# Patient Record
Sex: MALE | Race: WHITE
[De-identification: names, ages, dates, MRNs, and addresses within clinical notes are randomized per-mention and may not be internally consistent; named-entity substitution may affect disease eponyms.]

---

## 2018-04-01 ENCOUNTER — HOSPITAL ENCOUNTER (EMERGENCY)
Dept: HOSPITAL 56 - MW.ED | Age: 63
Discharge: HOME | End: 2018-04-01
Payer: COMMERCIAL

## 2018-04-01 VITALS — DIASTOLIC BLOOD PRESSURE: 81 MMHG | SYSTOLIC BLOOD PRESSURE: 121 MMHG

## 2018-04-01 DIAGNOSIS — Z79.899: ICD-10-CM

## 2018-04-01 DIAGNOSIS — E78.00: ICD-10-CM

## 2018-04-01 DIAGNOSIS — R07.89: Primary | ICD-10-CM

## 2018-04-01 DIAGNOSIS — I10: ICD-10-CM

## 2018-04-01 DIAGNOSIS — E03.9: ICD-10-CM

## 2018-04-01 LAB
CHLORIDE SERPL-SCNC: 102 MMOL/L (ref 98–107)
SODIUM SERPL-SCNC: 137 MMOL/L (ref 136–148)

## 2018-04-01 PROCEDURE — 36415 COLL VENOUS BLD VENIPUNCTURE: CPT

## 2018-04-01 PROCEDURE — 80053 COMPREHEN METABOLIC PANEL: CPT

## 2018-04-01 PROCEDURE — 96374 THER/PROPH/DIAG INJ IV PUSH: CPT

## 2018-04-01 PROCEDURE — 99285 EMERGENCY DEPT VISIT HI MDM: CPT

## 2018-04-01 PROCEDURE — 84484 ASSAY OF TROPONIN QUANT: CPT

## 2018-04-01 PROCEDURE — 85025 COMPLETE CBC W/AUTO DIFF WBC: CPT

## 2018-04-01 PROCEDURE — 71046 X-RAY EXAM CHEST 2 VIEWS: CPT

## 2018-04-01 PROCEDURE — 93005 ELECTROCARDIOGRAM TRACING: CPT

## 2018-04-01 NOTE — EDM.PDOC
ED HPI GENERAL MEDICAL PROBLEM





- General


Chief Complaint: Chest Pain


Stated Complaint: CHEST PAIN AND SOB


Time Seen by Provider: 04/01/18 11:52


Source of Information: Reports: Patient


History Limitations: Reports: No Limitations





- History of Present Illness


INITIAL COMMENTS - FREE TEXT/NARRATIVE: 


History of present illness:


[]Patient was treated for cough with Zithromax and is on his last day having 

right-sided rib pain with breathing. He is not complaining of more shortness of 

breath and denies any fevers chills any longer.





Review of systems: 


As per history of present illness and below otherwise all systems reviewed and 

negative.





Past medical history: 


As per history of present illness and as reviewed below otherwise 

noncontributory.





Surgical history: 


As per history of present illness and as reviewed below otherwise 

noncontributory.





Social history: 


No reported history of drug or alcohol abuse.





Family history: 


As per history of present illness and as reviewed below otherwise 

noncontributory.





Physical exam:


General: Well developed, well nourished in NAD


HEENT: Atraumatic, normocephalic, pupils reactive, negative for conjunctival 

pallor or scleral icterus, mucous membranes moist, throat clear, neck supple, 

nontender, trachea midline.


Lungs: Clear to auscultation, breath sounds equal bilaterally, chest tender to 

palpation over the right ribs. No subcutaneous crepitance tender


Heart: S1S2, regular, negative for clicks, rubs, or JVD.


Abdomen: Soft, nondistended, nontender. Negative for masses or 

hepatosplenomegaly. Negative for costovertebral tenderness.


Pelvis: Stable nontender.


Genitourinary: Deferred.


Rectal: Deferred.


Extremities: Atraumatic, negative for cords or calf pain. Neurovascular 

unremarkable.


Neuro: Awake, alert, oriented. Cranial nerves II through XII unremarkable. 

Cerebellum unremarkable. Motor and sensory unremarkable throughout. Exam 

nonfocal.





Diagnostics:


[]Chest x-ray negative for trach, pneumothorax or rib fracture. CBC negative, 

chemistries mildly elevated LFTs which patient states is normal for him





Therapeutics:


[]Aspirin and nitroglycerin nitroglycerin given initially Toradol with 

improvement of pain





Impression: 


[]Chest Wall pain





Plan:


[]Tramadol continue meds at home return if symptoms worsen or change follow-up 

with primary care





Definitive disposition and diagnosis as appropriate pending reevaluation and 

review of above.





  ** Right Chest


Pain Score (Numeric/FACES): 9





- Related Data


 Allergies











Allergy/AdvReac Type Severity Reaction Status Date / Time


 


No Known Allergies Allergy   Verified 04/01/18 11:50











Home Meds: 


 Home Meds





Levothyroxine 0.15 mg PO DAILY 06/24/15 [History]


atorvaSTATin [Lipitor] 40 mg PO DAILY 06/25/15 [History]


Allopurinol [Zyloprim] 0 mg PO DAILY 04/01/18 [History]


Losartan [Cozaar] 0 mg PO DAILY 04/01/18 [History]











Past Medical History


HEENT History: Reports: None


Cardiovascular History: Reports: High Cholesterol, Hypertension


Respiratory History: Reports: None


Other Respiratory History: does no use CPAP


Gastrointestinal History: Reports: None


Genitourinary History: Reports: None


Musculoskeletal History: Reports: Gout


Psychiatric History: Reports: None


Endocrine/Metabolic History: Reports: Hypothyroidism


Other Hematologic History: had blood transfusion as an infant





- Infectious Disease History


Infectious Disease History: Reports: None


Other Infectious Disease History: Pt does not know





- Past Surgical History


HEENT Surgical History: Reports: None


GI Surgical History: Reports: None





Social & Family History





- Family History


Family Medical History: Noncontributory





- Tobacco Use


Smoking Status *Q: Never Smoker





- Caffeine Use


Caffeine Use: Reports: None





- Alcohol Use


Days Per Week of Alcohol Use: 2





- Recreational Drug Use


Recreational Drug Use: No


Drug Use in Last 12 Months: No





ED ROS GENERAL





- Review of Systems


Review Of Systems: See Below (See history of present illness)





ED EXAM, GI/ABD





- Physical Exam


Exam: See Below (The history of present illness)





Course





- Vital Signs


Last Recorded V/S: 





 Last Vital Signs











Temp  97.6 F   04/01/18 11:47


 


Pulse  94   04/01/18 11:47


 


Resp  18   04/01/18 11:47


 


BP  131/93 H  04/01/18 12:01


 


Pulse Ox  97   04/01/18 11:47














- Orders/Labs/Meds


Orders: 





 Active Orders 24 hr











 Category Date Time Status


 


 EKG 12 Lead [EKG Documentation Completion] [RC] STAT Care  04/01/18 12:16 

Active


 


 Chest 2V [CR] Stat Exams  04/01/18 11:58 Taken


 


 Nitroglycerin [Nitrostat] Med  04/01/18 11:55 Active





 0.4 mg SL Q5M PRN   








 Medication Orders





Nitroglycerin (Nitrostat)  0.4 mg SL Q5M PRN


   PRN Reason: Chest Pain


   Last Admin: 04/01/18 12:01  Dose: 0.4 mg








Labs: 





 Laboratory Tests











  04/01/18 04/01/18 Range/Units





  11:49 11:49 


 


WBC  11.74 H   (4.0-11.0)  K/uL


 


RBC  4.90   (4.50-5.90)  M/uL


 


Hgb  14.5   (13.0-17.0)  g/dL


 


Hct  42.5   (38.0-50.0)  %


 


MCV  86.7   (80.0-98.0)  fL


 


MCH  29.6   (27.0-32.0)  pg


 


MCHC  34.1   (31.0-37.0)  g/dL


 


RDW Std Deviation  42.8   (28.0-62.0)  fl


 


RDW Coeff of Markus  14   (11.0-15.0)  %


 


Plt Count  265   (150-400)  K/uL


 


MPV  9.90   (7.40-12.00)  fL


 


Neut % (Auto)  74.0   (48.0-80.0)  %


 


Lymph % (Auto)  14.7 L   (16.0-40.0)  %


 


Mono % (Auto)  8.1   (0.0-15.0)  %


 


Eos % (Auto)  2.8   (0.0-7.0)  %


 


Baso % (Auto)  0.4   (0.0-1.5)  %


 


Neut # (Auto)  8.7 H   (1.4-5.7)  K/uL


 


Lymph # (Auto)  1.7   (0.6-2.4)  K/uL


 


Mono # (Auto)  1.0 H   (0.0-0.8)  K/uL


 


Eos # (Auto)  0.3   (0.0-0.7)  K/uL


 


Baso # (Auto)  0.1   (0.0-0.1)  K/uL


 


Nucleated RBC %  0.0   /100WBC


 


Nucleated RBCs #  0   K/uL


 


Sodium   137  (136-148)  mmol/L


 


Potassium   4.4  (3.5-5.1)  mmol/L


 


Chloride   102  ()  mmol/L


 


Carbon Dioxide   23.1  (21.0-32.0)  mmol/L


 


BUN   26 H  (7.0-18.0)  mg/dL


 


Creatinine   1.9 H  (0.8-1.3)  mg/dL


 


Est Cr Clr Drug Dosing   42.69  mL/min


 


Estimated GFR (MDRD)   36.3  ml/min


 


Glucose   125 H  ()  mg/dL


 


Calcium   9.2  (8.5-10.1)  mg/dL


 


Total Bilirubin   1.0  (0.2-1.0)  mg/dL


 


AST   57 H  (15-37)  IU/L


 


ALT   69 H  (14-63)  IU/L


 


Alkaline Phosphatase   125 H  ()  U/L


 


Troponin I   < 0.050  (0.000-0.056)  ng/mL


 


Total Protein   7.3  (6.4-8.2)  g/dL


 


Albumin   3.3 L  (3.4-5.0)  g/dL


 


Globulin   4.0 H  (2.0-3.5)  g/dL


 


Albumin/Globulin Ratio   0.8 L  (1.3-2.8)  











Meds: 





Medications











Generic Name Dose Route Start Last Admin





  Trade Name Freq  PRN Reason Stop Dose Admin


 


Nitroglycerin  0.4 mg  04/01/18 11:55  04/01/18 12:01





  Nitrostat  SL   0.4 mg





  Q5M PRN   Administration





  Chest Pain   





     





     





     














Discontinued Medications














Generic Name Dose Route Start Last Admin





  Trade Name Freq  PRN Reason Stop Dose Admin


 


Aspirin  324 mg  04/01/18 11:55  04/01/18 11:59





  Aspirin  PO  04/01/18 11:56  324 mg





  ONETIME ONE   Administration





     





     





     





     


 


Ketorolac Tromethamine  30 mg  04/01/18 11:57  04/01/18 12:03





  Toradol  IVPUSH  04/01/18 11:58  30 mg





  ONETIME ONE   Administration





     





     





     





     














Departure





- Departure


Time of Disposition: 12:57


Disposition: Home, Self-Care 01


Condition: Good


Clinical Impression: 


 Chest wall pain








- Discharge Information


Referrals: 


Matt Sheldon MD [Primary Care Provider] - 


Additional Instructions: 


The following information is given to patients seen in the emergency department 

who are being discharged to home. This information is to outline your options 

for follow-up care. We provide all patients seen in our emergency department 

with a follow-up referral.





The need for follow-up, as well as the timing and circumstances, are variable 

depending upon the specifics of your emergency department visit.





If you don't have a primary care physician on staff, we will provide you with a 

referral. We always advise you to contact your personal physician following an 

emergency department visit to inform them of the circumstance of the visit and 

for follow-up with them and/or the need for any referrals to a consulting 

specialist.





The emergency department will also refer you to a specialist when appropriate. 

This referral assures that you have the opportunity for follow-up care with a 

specialist. All of these measure are taken in an effort to provide you with 

optimal care, which includes your follow-up.





Under all circumstances we always encourage you to contact your private 

physician who remains a resource for coordinating your care. When calling for 

follow-up care, please make the office aware that this follow-up is from your 

recent emergency room visit. If for any reason you are refused follow-up, 

please contact the [facility] Emergency 

Department at [phone] and asked to speak to the emergency department 

charge nurse.





Tramadol for pain follow-up with primary care return if symptoms worsen or 

change





[facility]


Primary Care


[address] [place] [id number]


Phone: [phone]


Fax: [phone]





- My Orders


Last 24 Hours: 





My Active Orders





04/01/18 11:55


Nitroglycerin [Nitrostat]   0.4 mg SL Q5M PRN 





04/01/18 11:58


Chest 2V [CR] Stat 





04/01/18 12:16


EKG 12 Lead [EKG Documentation Completion] [RC] STAT 














- Assessment/Plan


Last 24 Hours: 





My Active Orders





04/01/18 11:55


Nitroglycerin [Nitrostat]   0.4 mg SL Q5M PRN 





04/01/18 11:58


Chest 2V [CR] Stat 





04/01/18 12:16


EKG 12 Lead [EKG Documentation Completion] [RC] STAT

## 2018-04-02 ENCOUNTER — HOSPITAL ENCOUNTER (OUTPATIENT)
Dept: HOSPITAL 56 - MW.ED | Age: 63
Setting detail: OBSERVATION
LOS: 1 days | Discharge: HOME | End: 2018-04-03
Attending: INTERNAL MEDICINE | Admitting: INTERNAL MEDICINE
Payer: COMMERCIAL

## 2018-04-02 DIAGNOSIS — N18.9: ICD-10-CM

## 2018-04-02 DIAGNOSIS — I82.409: Primary | ICD-10-CM

## 2018-04-02 DIAGNOSIS — E03.9: ICD-10-CM

## 2018-04-02 DIAGNOSIS — I26.99: ICD-10-CM

## 2018-04-02 DIAGNOSIS — Z79.899: ICD-10-CM

## 2018-04-02 DIAGNOSIS — I12.9: ICD-10-CM

## 2018-04-02 DIAGNOSIS — R07.89: ICD-10-CM

## 2018-04-02 PROCEDURE — 99284 EMERGENCY DEPT VISIT MOD MDM: CPT

## 2018-04-02 PROCEDURE — 80048 BASIC METABOLIC PNL TOTAL CA: CPT

## 2018-04-02 PROCEDURE — 96372 THER/PROPH/DIAG INJ SC/IM: CPT

## 2018-04-02 PROCEDURE — 85025 COMPLETE CBC W/AUTO DIFF WBC: CPT

## 2018-04-02 PROCEDURE — 85610 PROTHROMBIN TIME: CPT

## 2018-04-02 PROCEDURE — G0378 HOSPITAL OBSERVATION PER HR: HCPCS

## 2018-04-02 PROCEDURE — 36415 COLL VENOUS BLD VENIPUNCTURE: CPT

## 2018-04-02 PROCEDURE — 71045 X-RAY EXAM CHEST 1 VIEW: CPT

## 2018-04-02 PROCEDURE — 85730 THROMBOPLASTIN TIME PARTIAL: CPT

## 2018-04-02 NOTE — PCM.HP
H&P History of Present Illness





- General


Admit Problem/Dx: 


 Admission Diagnosis/Problem





Admission Diagnosis/Problem      DVT, Deep venous thrombosis of lower extremity











- History of Present Illness


Initial Comments - Free Text/Narative: 





63 yo male with pmh of gout, CKD, and HTN who was discovered to have DVT in 

left lower extremity in Dr. Buchanan office today.  In the end of january he 

reported left ankle edema and erythema and was treated for gout with 

prednisone.  The edema did resolve.  Last week patient reported cough, 

pleuritic chest pain and shortness of breath.  He was treated for prednisone 

and inhaler.  His pleuriti chest pain has resolved but in follow up with Dr. Rai today he had a lower extremity dopplar which revealed DVT.  He was sent 

to the ED for VQ scan but VQ scan malfunctioned.  The ED physcian then referred 

patient for observation.


  ** Middle Chest


Pain Score (Numeric/FACES): 1





- Related Data


Allergies/Adverse Reactions: 


 Allergies











Allergy/AdvReac Type Severity Reaction Status Date / Time


 


No Known Allergies Allergy   Verified 04/01/18 11:50











Home Medications: 


 Home Meds





Levothyroxine 150 mcg PO DAILY 06/24/15 [History]


atorvaSTATin [Lipitor] 40 mg PO DAILY 06/25/15 [History]


Allopurinol [Zyloprim] 300 mg PO DAILY 04/01/18 [History]


Losartan [Cozaar] 100 mg PO DAILY 04/01/18 [History]


Albuterol [IMW: Albuterol HFA] 1 puff IH Q6H PRN 04/02/18 [History]


Apixaban [Eliquis] 5 - 10 mg PO BID #70 tablet 04/03/18 [Rx]


Levothyroxine 225 mcg PO WEEKLY 04/03/18 [History]











Past Medical History


HEENT History: Reports: Impaired Vision


Cardiovascular History: Reports: High Cholesterol, Hypertension


Respiratory History: Reports: None


Other Respiratory History: does not use CPAP


Gastrointestinal History: Reports: None


Genitourinary History: Reports: None


Musculoskeletal History: Reports: Gout


Psychiatric History: Reports: None


Endocrine/Metabolic History: Reports: Hypothyroidism


Hematologic History: Reports: Blood Transfusion(s)


Other Hematologic History: had blood transfusion as an infant





- Infectious Disease History


Infectious Disease History: Reports: None


Other Infectious Disease History: Pt does not know





- Past Surgical History


HEENT Surgical History: Reports: None


GI Surgical History: Reports: None


Endocrine Surgical History: Reports: Other (See Below)


Other Endocrine Surgeries/Procedures: radiation to thyroid


Musculoskeletal Surgical History: Reports: Other (See Below)


Other Musculoskeletal Surgeries/Procedures:: 2 left knee surgeries; right torn 

meniscus 2015





Social & Family History





- Family History


Family Medical History: Noncontributory





- Tobacco Use


Smoking Status *Q: Never Smoker


Second Hand Smoke Exposure: No





- Caffeine Use


Caffeine Use: Reports: Soda, Tea





- Alcohol Use


Days Per Week of Alcohol Use: 2





- Recreational Drug Use


Recreational Drug Use: No


Drug Use in Last 12 Months: No





H&P Review of Systems





- Review of Systems:


Review Of Systems: ROS reveals no pertinent complaints other than HPI.


Hematologic/Lymphatic: Denies: Anemia, Easy Bleeding, Easy Bruising





Exam





- Exam


Exam: See Below





- Vital Signs


Vital Signs: 


 Last Vital Signs











Temp  36.3 C   04/02/18 19:50


 


Pulse  87   04/02/18 19:50


 


Resp  16   04/02/18 19:50


 


BP  148/85 H  04/02/18 19:50


 


Pulse Ox  97   04/02/18 19:50











Weight: 107.7 kg





- Exam


General: Alert, Oriented


HEENT: Posterior Pharynx Clear


Neck: Supple


Lungs: Clear to Auscultation, Normal Respiratory Effort


Cardiovascular: Regular Rate, Regular Rhythm


GI/Abdominal Exam: Normal Bowel Sounds, Soft, Non-Tender


Extremities: Non-Tender, No Pedal Edema


Skin: Warm, Dry, Intact





- Patient Data


Lab Results Last 24 hrs: 


 Laboratory Results - last 24 hr











  04/02/18 04/02/18 Range/Units





  17:10 17:10 


 


INR  1.03   


 


APTT   22.6  (18.6-31.3)  SEC











Result Diagrams: 


 04/03/18 04:55





 04/03/18 04:55


Problem List Initiated/Reviewed/Updated: Yes


Orders Last 24hrs: 


 Active Orders 24 hr











 Category Date Time Status


 


 Patient Status [ADT] Stat ADT  04/02/18 18:41 Active


 


 EKG 12 Lead [EKG Documentation Completion] [RC] STAT Care  04/02/18 17:25 

Active


 


 Oxygen Therapy [RC] PRN Care  04/02/18 21:47 Ordered


 


 Up ad Tabitha [RC] ASDIRECTED Care  04/02/18 21:47 Ordered


 


 VTE/DVT Education [RC] PER UNIT ROUTINE Care  04/02/18 21:47 Ordered


 


 Vital Signs [RC] Q4H Care  04/02/18 21:47 Ordered


 


 Regular Diet [DIET] Diet  04/02/18 Breakfast Ordered


 


 Chest 1V Frontal [CR] Stat Exams  04/02/18 17:09 Taken


 


 Lung Vent Perfusion [NM] Stat Exams  04/02/18 17:01 Stop Req


 


 BASIC METABOLIC PANEL,BMP [CHEM] AM Lab  04/03/18 05:11 Ordered


 


 CBC WITH AUTO DIFF [HEME] AM Lab  04/03/18 05:11 Ordered


 


 Enoxaparin [Lovenox] Med  04/03/18 05:00 Ordered





 107 mg SUBCUT Q12H   


 


 Levothyroxine Med  04/02/18 22:00 Ordered





 150 mcg PO ASDIRECTED   


 


 Losartan Med  04/03/18 09:00 Ordered





 100 mg PO DAILY   


 


 Sodium Chloride 0.9% [Saline Flush] Med  04/02/18 17:07 Active





 10 ml FLUSH ASDIRECTED PRN   


 


 Sodium Chloride 0.9% [Saline Flush] Med  04/02/18 17:07 Active





 2.5 ml FLUSH ASDIRECTED PRN   


 


 atorvaSTATin [Lipitor] Med  04/03/18 09:00 Ordered





 40 mg PO DAILY   


 


 Saline Lock Insert [OM.PC] Stat Oth  04/02/18 17:07 Ordered


 


 Sequential Compression Device [OM.PC] Per Unit Routine Oth  04/02/18 21:47 

Ordered


 


 Resuscitation Status Routine Resus Stat  04/02/18 21:47 Ordered








 Medication Orders





Enoxaparin Sodium (Lovenox)  107 mg SUBCUT Q12H CANDY


Sodium Chloride (Saline Flush)  10 ml FLUSH ASDIRECTED PRN


   PRN Reason: Keep Vein Open


   Last Admin: 04/02/18 18:57  Dose: 10 ml


Sodium Chloride (Saline Flush)  2.5 ml FLUSH ASDIRECTED PRN


   PRN Reason: Keep Vein Open


   Last Admin: 04/02/18 18:57  Dose: 2.5 ml








Assessment/Plan Comment:: 





63 yo male admitted with VTE. He has confirmed DVT and likely PE due to 

symptoms that he has had this past week. PAtient has no severe symptoms, 

hemodynamiclly stable, and  not hypoxic.  I do not think we need to pursue 

further work up for PE considering his CKD and him already requiring 

anticoagulation for his DVT.  Patient was started on lovenox in the ED.  Will 

monitor overnight and start oral anticoagulation in the morning.

## 2018-04-02 NOTE — CR
EXAM DATE: 18



PATIENT'S AGE: 62





Patient: VINH DUNHAM



Facility: [place], [place]

Patient ID: [id number]

Site Patient ID: [id number].

Site Accession #: [id number]

: 1955

Study: XRay Chest [id number]/1/2018 12:30:41 PM

Ordering Physician: Marco Terrazas



Final Report: 

INDICATION:

Pain; shortness of breath; cough.



COMPARISON:

None.



TECHNIQUE:

Two-view chest.



FINDINGS:

Normal size cardiac silhouette. Elevated right hemidiaphragm. No acute 
pneumonic infiltrates or CHF. No pneumothorax or pleural effusion.



IMPRESSION:

Negative chest.





Dictated by Justa Russell MD @ 2018 12:31PM

(Electronic Signature)



Report Signed by Proxy.
TOSHAD

## 2018-04-02 NOTE — EDM.PDOC
ED HPI GENERAL MEDICAL PROBLEM





- General


Chief Complaint: General


Stated Complaint: PT HAS BLOOD CLOT IN LT LEG


Time Seen by Provider: 04/02/18 16:58


Source of Information: Reports: Patient


History Limitations: Reports: No Limitations





- History of Present Illness


INITIAL COMMENTS - FREE TEXT/NARRATIVE: 


History of present illness:


[]Patient was sent in from Dr. Sheldon's office after discovering he has a DVT 

in his leg during a follow-up visit for gout. Patient was recently treated for 

bronchitis and was seen in the ER yesterday for rib pain and pain with 

breathing. Dr. Sheldon did repeat labs and a shows a BUN/creatinine of 35/2.3 

today. He served in the ED for treatment of his DVT and rule out PE.





Review of systems: 


As per history of present illness and below otherwise all systems reviewed and 

negative.





Past medical history: 


As per history of present illness and as reviewed below otherwise 

noncontributory.





Surgical history: 


As per history of present illness and as reviewed below otherwise 

noncontributory.





Social history: 


No reported history of drug or alcohol abuse.





Family history: 


As per history of present illness and as reviewed below otherwise 

noncontributory.





Physical exam:


General: Well developed, well nourished in NAD


HEENT: Atraumatic, normocephalic, pupils reactive, negative for conjunctival 

pallor or scleral icterus, mucous membranes moist, throat clear, neck supple, 

nontender, trachea midline.


Lungs: Clear to auscultation, breath sounds equal bilaterally, chest nontender.


Heart: S1S2, regular, negative for clicks, rubs, or JVD.


Abdomen: Soft, nondistended, nontender. Negative for masses or 

hepatosplenomegaly. Negative for costovertebral tenderness.


Pelvis: Stable nontender.


Genitourinary: Deferred.


Rectal: Deferred.


Extremities: Atraumatic, negative for cords or calf pain. Neurovascular 

unremarkable.


Neuro: Awake, alert, oriented. Cranial nerves II through XII unremarkable. 

Cerebellum unremarkable. Motor and sensory unremarkable throughout. Exam 

nonfocal.





Diagnostics:


[]VQ scan ordered while he was in the VQ scanner the machine is started 

cracking and started smelling of fire so the test was stopped at this point I 

consulted Dr. Martinez to see what the best plan would be and he recommended 

admitting for observation and starting him on EliQuist tomorrow





Therapeutics:


[]Lovenox





Impression: 


[]DVT, 





Plan:


[]Admit for observation telemetry





Definitive disposition and diagnosis as appropriate pending reevaluation and 

review of above.





  ** Middle Chest


Pain Score (Numeric/FACES): 1





- Related Data


 Allergies











Allergy/AdvReac Type Severity Reaction Status Date / Time


 


No Known Allergies Allergy   Verified 04/01/18 11:50











Home Meds: 


 Home Meds





Levothyroxine 150 mcg PO DAILY 06/24/15 [History]


atorvaSTATin [Lipitor] 40 mg PO DAILY 06/25/15 [History]


Allopurinol [Zyloprim] 300 mg PO DAILY 04/01/18 [History]


Losartan [Cozaar] 100 mg PO DAILY 04/01/18 [History]


Albuterol [IMW: Albuterol HFA] 1 puff IH Q6H PRN 04/02/18 [History]


Levothyroxine 225 mcg PO WEEKLY 04/03/18 [History]











Past Medical History


HEENT History: Reports: None


Cardiovascular History: Reports: High Cholesterol, Hypertension


Respiratory History: Reports: None


Other Respiratory History: does no use CPAP


Gastrointestinal History: Reports: None


Genitourinary History: Reports: None


Musculoskeletal History: Reports: Gout


Psychiatric History: Reports: None


Endocrine/Metabolic History: Reports: Hypothyroidism


Other Hematologic History: had blood transfusion as an infant





- Infectious Disease History


Infectious Disease History: Reports: None


Other Infectious Disease History: Pt does not know





- Past Surgical History


HEENT Surgical History: Reports: None


GI Surgical History: Reports: None





Social & Family History





- Family History


Family Medical History: Noncontributory





- Tobacco Use


Smoking Status *Q: Never Smoker





- Caffeine Use


Caffeine Use: Reports: None





- Alcohol Use


Days Per Week of Alcohol Use: 2





- Recreational Drug Use


Recreational Drug Use: No


Drug Use in Last 12 Months: No





ED ROS GENERAL





- Review of Systems


Review Of Systems: See Below





ED EXAM, GENERAL





- Physical Exam


Exam: See Below (The history of present illness)





Course





- Vital Signs


Last Recorded V/S: 


 Last Vital Signs











Temp  98.0 F   04/03/18 07:52


 


Pulse  95   04/03/18 07:52


 


Resp  20   04/03/18 07:52


 


BP  129/83   04/03/18 09:50


 


Pulse Ox  94 L  04/03/18 07:52














- Orders/Labs/Meds


Orders: 


 Active Orders 24 hr











 Category Date Time Status


 


 EKG 12 Lead [EKG Documentation Completion] [RC] STAT Care  04/02/18 17:25 

Active


 


 Sodium Chloride 0.9% [Saline Flush] Med  04/02/18 17:07 Active





 10 ml FLUSH ASDIRECTED PRN   


 


 Sodium Chloride 0.9% [Saline Flush] Med  04/02/18 17:07 Active





 2.5 ml FLUSH ASDIRECTED PRN   


 


 Saline Lock Insert [OM.PC] Stat Oth  04/02/18 17:07 Ordered








 Medication Orders





Atorvastatin Calcium (Lipitor)  40 mg PO DAILY [facility]


   Last Admin: 04/03/18 09:51  Dose: 40 mg


Enoxaparin Sodium (Lovenox)  110 mg SUBCUT Q12H [facility]


Levothyroxine Sodium (Levothyroxine)  150 mcg PO ACBREAKFAST [facility]


   Last Admin: 04/03/18 08:06  Dose: 150 mcg


Levothyroxine Sodium (Levothyroxine)  75 mcg PO Mo@0730 [facility]


Losartan Potassium (Cozaar)  100 mg PO DAILY [facility]


   Last Admin: 04/03/18 09:50  Dose: 100 mg


Sodium Chloride (Saline Flush)  10 ml FLUSH ASDIRECTED PRN


   PRN Reason: Keep Vein Open


   Last Admin: 04/02/18 18:57  Dose: 10 ml


Sodium Chloride (Saline Flush)  2.5 ml FLUSH ASDIRECTED PRN


   PRN Reason: Keep Vein Open


   Last Admin: 04/02/18 18:57  Dose: 2.5 ml








Labs: 


 Laboratory Tests











  04/02/18 04/02/18 Range/Units





  17:10 17:10 


 


INR  1.03   


 


APTT   22.6  (18.6-31.3)  SEC











Meds: 


Medications











Generic Name Dose Route Start Last Admin





  Trade Name Freq  PRN Reason Stop Dose Admin


 


Atorvastatin Calcium  40 mg  04/03/18 09:00  04/03/18 09:51





  Lipitor  PO   40 mg





  DAILY [facility]   Administration





     





     





     





     


 


Enoxaparin Sodium  110 mg  04/03/18 17:00  





  Lovenox  SUBCUT   





  Q12H [facility]   





     





     





     





     


 


Levothyroxine Sodium  150 mcg  04/03/18 07:30  04/03/18 08:06





  Levothyroxine  PO   150 mcg





  ACBREAKFAST [facility]   Administration





     





     





     





     


 


Levothyroxine Sodium  75 mcg  04/09/18 07:30  





  Levothyroxine  PO   





  Mo@0730 [facility]   





     





     





     





     


 


Losartan Potassium  100 mg  04/03/18 09:00  04/03/18 09:50





  Cozaar  PO   100 mg





  DAILY [facility]   Administration





     





     





     





     


 


Sodium Chloride  10 ml  04/02/18 17:07  04/02/18 18:57





  Saline Flush  FLUSH   10 ml





  ASDIRECTED PRN   Administration





  Keep Vein Open   





     





     





     


 


Sodium Chloride  2.5 ml  04/02/18 17:07  04/02/18 18:57





  Saline Flush  FLUSH   2.5 ml





  ASDIRECTED PRN   Administration





  Keep Vein Open   





     





     





     














Discontinued Medications














Generic Name Dose Route Start Last Admin





  Trade Name Adarsh  PRN Reason Stop Dose Admin


 


Enoxaparin Sodium  107 mg  04/02/18 17:06  04/02/18 17:20





  Lovenox  SUBCUT  04/02/18 17:07  107 mg





  ONETIME ONE   Administration





     





     





     





     


 


Enoxaparin Sodium  107 mg  04/03/18 05:00  04/03/18 06:23





  Lovenox  SUBCUT   107 mg





  Q12H [facility]   Administration





     





     





     





     














Departure





- Departure


Time of Disposition: 19:00


Disposition: Refer to Observation


Condition: Good


Clinical Impression: 


 DVT (deep venous thrombosis)








- Discharge Information





- My Orders


Last 24 Hours: 


My Active Orders





04/02/18 17:07


Sodium Chloride 0.9% [Saline Flush]   10 ml FLUSH ASDIRECTED PRN 


Sodium Chloride 0.9% [Saline Flush]   2.5 ml FLUSH ASDIRECTED PRN 


Saline Lock Insert [OM.PC] Stat 





04/02/18 17:25


EKG 12 Lead [EKG Documentation Completion] [RC] STAT 














- Assessment/Plan


Last 24 Hours: 


My Active Orders





04/02/18 17:07


Sodium Chloride 0.9% [Saline Flush]   10 ml FLUSH ASDIRECTED PRN 


Sodium Chloride 0.9% [Saline Flush]   2.5 ml FLUSH ASDIRECTED PRN 


Saline Lock Insert [OM.PC] Stat 





04/02/18 17:25


EKG 12 Lead [EKG Documentation Completion] [RC] STAT

## 2018-04-03 VITALS — DIASTOLIC BLOOD PRESSURE: 90 MMHG | SYSTOLIC BLOOD PRESSURE: 137 MMHG

## 2018-04-03 LAB
CHLORIDE SERPL-SCNC: 106 MMOL/L (ref 98–107)
SODIUM SERPL-SCNC: 139 MMOL/L (ref 136–148)

## 2018-04-03 NOTE — PCM.DCSUM1
**Discharge Summary





- Hospital Course


Brief History: 63 yo male with pmh of gout, CKD, and HTN who was discovered to 

have DVT in left lower extremity in Dr. Buchanan office today.  In the end of 

January he reported left ankle edema and erythema and was treated for gout with 

prednisone, which helped resolve the edema. He did travel to [place] and [place] 

by plane in early January.  Last week patient reported cough, pleuritic chest 

pain and shortness of breath.  He was treated for prednisone and inhaler.  His 

pleuriti chest pain has resolved but in follow up with Dr. Rai today he had 

a lower extremity dopplar which revealed DVT.  He was sent to the ED for VQ 

scan but VQ scan malfunctioned.  The ED physcian then referred patient for 

observation.





- Discharge Data


Discharge Date: 04/03/18


Discharge Disposition: Home, Self-Care 01


Condition: Stable





- Discharge Diagnosis/Problem(s)


(1) DVT (deep venous thrombosis)


SNOMED Code(s): 495370056


   ICD Code: I82.409 - ACUTE EMBOLISM AND THOMBOS UNSP DEEP VN UNSP LOWER 

EXTREMITY   Status: Acute   


Qualifiers: 


   DVT location: lower extremity   Chronicity: acute 





(2) Pulmonary embolism


SNOMED Code(s): 79076571


   ICD Code: I26.99 - OTHER PULMONARY EMBOLISM WITHOUT ACUTE COR PULMONALE   

Status: Suspected   


Qualifiers: 


   Pulmonary embolism type: other   Chronicity: acute   Acute cor pulmonale 

presence: without acute cor pulmonale   Qualified Code(s): I26.99 - Other 

pulmonary embolism without acute cor pulmonale   





(3) Chest wall pain


SNOMED Code(s): 632133426


   ICD Code: R07.89 - OTHER CHEST PAIN   Status: Acute   





(4) HTN (hypertension)


SNOMED Code(s): 02610652


   ICD Code: I10 - ESSENTIAL (PRIMARY) HYPERTENSION   Status: Chronic   


Qualifiers: 


   Hypertension type: essential hypertension   Qualified Code(s): I10 - 

Essential (primary) hypertension   





(5) CKD (chronic kidney disease)


SNOMED Code(s): 274272307


   ICD Code: N18.9 - CHRONIC KIDNEY DISEASE, UNSPECIFIED   Status: Chronic   





(6) Hypothyroidism


SNOMED Code(s): 92536493


   ICD Code: E03.9 - HYPOTHYROIDISM, UNSPECIFIED   Status: Chronic   





- Patient Instructions


Diet: Usual Diet as Tolerated


Activity: As Tolerated, No Strenuous Activities (or contact activities.)


Driving: [place] [address] Today


Showering/Bathing: May Shower


Notify Provider of: Fever, Increased Pain, Swelling and Redness, Drainage, 

Nausea and/or Vomiting





- Discharge Plan


Prescriptions/Med Rec: 


Apixaban [Eliquis] 5 - 10 mg PO BID #70 tablet


Home Medications: 


 Home Meds





Levothyroxine 150 mcg PO DAILY 06/24/15 [History]


atorvaSTATin [Lipitor] 40 mg PO DAILY 06/25/15 [History]


Allopurinol [Zyloprim] 300 mg PO DAILY 04/01/18 [History]


Losartan [Cozaar] 100 mg PO DAILY 04/01/18 [History]


Albuterol [IMW: Albuterol HFA] 1 puff IH Q6H PRN 04/02/18 [History]


Apixaban [Eliquis] 5 - 10 mg PO BID #70 tablet 04/03/18 [Rx]


Levothyroxine 225 mcg PO WEEKLY 04/03/18 [History]








Patient Handouts:  Apixaban oral tablets, Deep Vein Thrombosis


Referrals: 


Matt Sheldon MD [Physician] - 04/09/18 9:15 am





- Discharge Summary/Plan Comment


DC Time >30 min.: No


Discharge Summary/Plan Comment: 





Discharge Diagnoses:





DVT


Suspected PE


HTN


CKD


Hypothyroidism





Navid was admitted and treated with Lovenox for DVT of lower extremity. He did 

have some chest wall pain and cough which did improve overnight. Unable to 

obtain CT chest angio to evaluated for PE due to CKD and VQ machine 

unavailable. Due to known DVT and likely PE, this will not change treatment. 

Will place on Eliquis 10 mg BID x 7 days then decrease to 5 mg BID for 6 

months. I notified PCP, Dr Sheldon of this plan and he is agreeable. Today 

Navid is feeling better, no hypoxia noted and no pain to leg. He will be 

discharged home. No contact sports or activities, otherwise as tolerated. He is 

to return to ED or clinic if concerns should arise. We discussed side effects 

of Elqiuis and monitor of bleeding. He verbalized understanding. 





- General Info


Date of Service: 04/03/18


Admission Dx/Problem (Free Text: 


 Admission Diagnosis/Problem





Admission Diagnosis/Problem      DVT, Deep venous thrombosis of lower extremity








Subjective Update: 





Sitting in bed, feeling well. No chest pain, SOB or palpitations. No pain. 

Feeling ready for discharge home.


Functional Status: Reports: Pain Controlled, Tolerating Diet, Ambulating, 

Urinating





- Review of Systems


General: Reports: No Symptoms.  Denies: Fever, Weakness, Fatigue


Pulmonary: Reports: No Symptoms.  Denies: Shortness of Breath


Cardiovascular: Reports: No Symptoms.  Denies: Chest Pain


Gastrointestinal: Reports: No Symptoms.  Denies: Abdominal Pain, Nausea, 

Vomiting


Genitourinary: Reports: No Symptoms.  Denies: Dysuria, Frequency, Burning


Musculoskeletal: Reports: No Symptoms


Skin: Reports: No Symptoms


Neurological: Reports: No Symptoms


Psychiatric: Reports: No Symptoms





- Patient Data


Vitals - Most Recent: 


 Last Vital Signs











Temp  98.0 F   04/03/18 07:52


 


Pulse  95   04/03/18 07:52


 


Resp  20   04/03/18 07:52


 


BP  129/83   04/03/18 09:50


 


Pulse Ox  94 L  04/03/18 07:52











Weight - Most Recent: 107.7 kg


I&O - Last 24 hours: 


 Intake & Output











 04/02/18 04/03/18 04/03/18





 22:59 06:59 14:59


 


Intake Total  50 


 


Output Total  640 


 


Balance  -590 











Lab Results - Last 24 hrs: 


 Laboratory Results - last 24 hr











  04/02/18 04/02/18 04/03/18 Range/Units





  17:10 17:10 04:55 


 


WBC    8.20  (4.0-11.0)  K/uL


 


RBC    4.39 L  (4.50-5.90)  M/uL


 


Hgb    12.7 L  (13.0-17.0)  g/dL


 


Hct    38.2  (38.0-50.0)  %


 


MCV    87.0  (80.0-98.0)  fL


 


MCH    28.9  (27.0-32.0)  pg


 


MCHC    33.2  (31.0-37.0)  g/dL


 


RDW Std Deviation    43.4  (28.0-62.0)  fl


 


RDW Coeff of Markus    14  (11.0-15.0)  %


 


Plt Count    311  (150-400)  K/uL


 


MPV    10.00  (7.40-12.00)  fL


 


Neut % (Auto)    66.5  (48.0-80.0)  %


 


Lymph % (Auto)    18.2  (16.0-40.0)  %


 


Mono % (Auto)    7.9  (0.0-15.0)  %


 


Eos % (Auto)    6.5  (0.0-7.0)  %


 


Baso % (Auto)    0.9  (0.0-1.5)  %


 


Neut # (Auto)    5.5  (1.4-5.7)  K/uL


 


Lymph # (Auto)    1.5  (0.6-2.4)  K/uL


 


Mono # (Auto)    0.7  (0.0-0.8)  K/uL


 


Eos # (Auto)    0.5  (0.0-0.7)  K/uL


 


Baso # (Auto)    0.1  (0.0-0.1)  K/uL


 


Nucleated RBC %    0.0  /100WBC


 


Nucleated RBCs #    0  K/uL


 


INR  1.03    


 


APTT   22.6   (18.6-31.3)  SEC


 


Sodium     (136-148)  mmol/L


 


Potassium     (3.5-5.1)  mmol/L


 


Chloride     ()  mmol/L


 


Carbon Dioxide     (21.0-32.0)  mmol/L


 


BUN     (7.0-18.0)  mg/dL


 


Creatinine     (0.8-1.3)  mg/dL


 


Est Cr Clr Drug Dosing     mL/min


 


Estimated GFR (MDRD)     ml/min


 


Glucose     ()  mg/dL


 


Calcium     (8.5-10.1)  mg/dL














  04/03/18 Range/Units





  04:55 


 


WBC   (4.0-11.0)  K/uL


 


RBC   (4.50-5.90)  M/uL


 


Hgb   (13.0-17.0)  g/dL


 


Hct   (38.0-50.0)  %


 


MCV   (80.0-98.0)  fL


 


MCH   (27.0-32.0)  pg


 


MCHC   (31.0-37.0)  g/dL


 


RDW Std Deviation   (28.0-62.0)  fl


 


RDW Coeff of Markus   (11.0-15.0)  %


 


Plt Count   (150-400)  K/uL


 


MPV   (7.40-12.00)  fL


 


Neut % (Auto)   (48.0-80.0)  %


 


Lymph % (Auto)   (16.0-40.0)  %


 


Mono % (Auto)   (0.0-15.0)  %


 


Eos % (Auto)   (0.0-7.0)  %


 


Baso % (Auto)   (0.0-1.5)  %


 


Neut # (Auto)   (1.4-5.7)  K/uL


 


Lymph # (Auto)   (0.6-2.4)  K/uL


 


Mono # (Auto)   (0.0-0.8)  K/uL


 


Eos # (Auto)   (0.0-0.7)  K/uL


 


Baso # (Auto)   (0.0-0.1)  K/uL


 


Nucleated RBC %   /100WBC


 


Nucleated RBCs #   K/uL


 


INR   


 


APTT   (18.6-31.3)  SEC


 


Sodium  139  (136-148)  mmol/L


 


Potassium  4.3  (3.5-5.1)  mmol/L


 


Chloride  106  ()  mmol/L


 


Carbon Dioxide  24.4  (21.0-32.0)  mmol/L


 


BUN  40 H  (7.0-18.0)  mg/dL


 


Creatinine  2.0 H  (0.8-1.3)  mg/dL


 


Est Cr Clr Drug Dosing  40.66  mL/min


 


Estimated GFR (MDRD)  34.3  ml/min


 


Glucose  128 H  ()  mg/dL


 


Calcium  8.6  (8.5-10.1)  mg/dL











Med Orders - Current: 


 Current Medications





Atorvastatin Calcium (Lipitor)  40 mg PO DAILY [facility]


   Last Admin: 04/03/18 09:51 Dose:  40 mg


Enoxaparin Sodium (Lovenox)  110 mg SUBCUT Q12H [facility]


Levothyroxine Sodium (Levothyroxine)  150 mcg PO ACBREAKFAST [facility]


   Last Admin: 04/03/18 08:06 Dose:  150 mcg


Levothyroxine Sodium (Levothyroxine)  75 mcg PO Mo@0730 [facility]


Losartan Potassium (Cozaar)  100 mg PO DAILY [facility]


   Last Admin: 04/03/18 09:50 Dose:  100 mg


Sodium Chloride (Saline Flush)  10 ml FLUSH ASDIRECTED PRN


   PRN Reason: Keep Vein Open


   Last Admin: 04/02/18 18:57 Dose:  10 ml


Sodium Chloride (Saline Flush)  2.5 ml FLUSH ASDIRECTED PRN


   PRN Reason: Keep Vein Open


   Last Admin: 04/02/18 18:57 Dose:  2.5 ml





Discontinued Medications





Enoxaparin Sodium (Lovenox)  107 mg SUBCUT ONETIME ONE


   Stop: 04/02/18 17:07


   Last Admin: 04/02/18 17:20 Dose:  107 mg


Enoxaparin Sodium (Lovenox)  107 mg SUBCUT Q12H [facility]


   Last Admin: 04/03/18 06:23 Dose:  107 mg











- Exam


Quality Assessment: Reports: DVT Prophylaxis.  Denies: Supplemental Oxygen


General: Reports: Alert, Oriented, Cooperative, No Acute Distress


Neck: Reports: Supple


Lungs: Reports: Clear to Auscultation, Normal Respiratory Effort


Cardiovascular: Reports: Regular Rate, Regular Rhythm


GI/Abdominal Exam: Normal Bowel Sounds, Soft, Non-Tender, No Organomegaly, No 

Distention, No Abnormal Bruit, No Mass, Pelvis Stable


Back Exam: Reports: Normal Inspection, Full Range of Motion


Extremities: Normal Inspection, Normal Range of Motion, Non-Tender, No Pedal 

Edema, Normal Capillary Refill


Neurological: Reports: No New Focal Deficit


Psy/Mental Status: Reports: Alert, Normal Affect, Normal Mood

## 2018-04-03 NOTE — CR
EXAM DATE: 18



PATIENT'S AGE: 62





Patient: VINH DUNHAM



Facility: [place], [place]

Patient ID: [id number]

Site Patient ID: [id number].

Site Accession #: [id number].

: 1955

Study: XRay Chest [id number]/2/2018 5:32:53 PM

Ordering Physician: Doctor Temp



Final Report: 

INDICATION: Right-sided Chest Pain. Positive left lower extremity DVT.



TECHNIQUE:

Chest 1 view 



COMPARISON:

2018 



FINDINGS:

Cardiovascular and mediastinum: Heart size and vasculature are normal in 
caliber and appearance. Mediastinum is within normal limits. 

Lungs and pleural space: No focal consolidation. Elevation of the right 
hemidiaphragm. No sign of pleural effusion. No pneumothorax. 

Bones and soft tissues: No significant findings. 



IMPRESSION:

No acute cardiopulmonary disease



Dictated by Benton Christensen MD @ 2018 6:09:10 PM





Dictated by: Benton Christensen MD @ 2018 18:09:17

(Electronic Signature)



Report Signed by Proxy.
ALONSO

## 2019-10-11 ENCOUNTER — HOSPITAL ENCOUNTER (EMERGENCY)
Dept: HOSPITAL 56 - MW.ED | Age: 64
LOS: 1 days | Discharge: HOME | End: 2019-10-12
Payer: COMMERCIAL

## 2019-10-11 VITALS — DIASTOLIC BLOOD PRESSURE: 83 MMHG | SYSTOLIC BLOOD PRESSURE: 130 MMHG | HEART RATE: 77 BPM

## 2019-10-11 DIAGNOSIS — E03.9: ICD-10-CM

## 2019-10-11 DIAGNOSIS — I82.412: Primary | ICD-10-CM

## 2019-10-11 DIAGNOSIS — Z79.890: ICD-10-CM

## 2019-10-11 DIAGNOSIS — E78.00: ICD-10-CM

## 2019-10-11 DIAGNOSIS — Z79.899: ICD-10-CM

## 2019-10-11 DIAGNOSIS — I82.432: ICD-10-CM

## 2019-10-11 DIAGNOSIS — I10: ICD-10-CM

## 2019-10-11 DIAGNOSIS — M10.9: ICD-10-CM

## 2019-10-11 PROCEDURE — 85730 THROMBOPLASTIN TIME PARTIAL: CPT

## 2019-10-11 PROCEDURE — 36415 COLL VENOUS BLD VENIPUNCTURE: CPT

## 2019-10-11 PROCEDURE — 85610 PROTHROMBIN TIME: CPT

## 2019-10-11 PROCEDURE — 85025 COMPLETE CBC W/AUTO DIFF WBC: CPT

## 2019-10-11 PROCEDURE — 96372 THER/PROPH/DIAG INJ SC/IM: CPT

## 2019-10-11 PROCEDURE — 93971 EXTREMITY STUDY: CPT

## 2019-10-11 PROCEDURE — 99284 EMERGENCY DEPT VISIT MOD MDM: CPT

## 2019-10-11 NOTE — EDM.PDOC
ED HPI GENERAL MEDICAL PROBLEM





- General


Chief Complaint: Lower Extremity Injury/Pain


Stated Complaint: PAIN IN LEFT LEG


Time Seen by Provider: 10/11/19 23:09


Source of Information: Reports: Patient


History Limitations: Reports: No Limitations





- History of Present Illness


INITIAL COMMENTS - FREE TEXT/NARRATIVE: 


HISTORY AND PHYSICAL:





History of present illness:


Patient is a 63-year-old male who presents to the ED today with concern of left 

calf swelling, pain, and firmness since this evening. Patient states he's had 

one blood clot in the past in 2017 in which she also had spots that went to his 

lungs and states they thought it was due to a long flight. Patient states she's 

been on blood thinners since those and has not had any recurring clots but 

patient states he was just stopped his blood thinners in August, 2 months ago. 

Patient denies any shortness of breath or hemoptysis.





Patient denies fever, chills, chest pain, shortness of breath, or cough. Denies 

headache, neck stiff ness, change in vision, syncope, or near syncope. Denies 

nausea, vomiting, abdominal pain, diarrhea, constipation, or dysuria. Has not 

noted any blood in urine or stool. Patient has been eating and drinking 

appropriately.





Review of systems: 


As per history of present illness and below otherwise all systems reviewed and 

negative.





Past medical history: 


As per history of present illness and as reviewed below otherwise 

noncontributory.





Surgical history: 


As per history of present illness and as reviewed below otherwise 

noncontributory.





Social history: 


See social history for further information





Family history: 


As per history of present illness and as reviewed below otherwise 

noncontributory.





Physical exam:


General: Patient is alert, oriented, and in no acute distress. Patient laying 

comfortably on exam table.


HEENT: Atraumatic, normocephalic, pupils equal and reactive bilaterally, 

negative for conjunctival pallor or scleral icterus, mucous membranes moist, 

TMs normal bilaterally, throat clear, neck supple, nontender, trachea midline. 

No drooling or trismus noted. No meningeal signs. No hot potato voice noted. 


Lungs: Clear to auscultation, breath sounds equal bilaterally, chest nontender.


Heart: S1S2, regular rate and rhythm without overt murmur


Abdomen: Soft, nondistended, nontender. Negative for masses or 

hepatosplenomegaly. Negative for costovertebral tenderness.


Pelvis: Stable nontender.


Genitourinary: Deferred.


Rectal: Deferred.


Skin: Intact, warm, dry. No lesions or rashes noted.


Extremities: Atraumatic, negative for cords. Neurovascular unremarkable. The 

left calf is more firm to palpation than the right calf and mildly more 

edematous. The left calf is non erythematous or warm to the touch but is mildly 

painful to palpation. DP/PT pulses intact bilaterally with capillary refill 

less than 2 seconds.


Neuro: Awake, alert, oriented. Cranial nerves II through XII unremarkable. 

Cerebellum unremarkable. Motor and sensory unremarkable throughout. Exam 

nonfocal.





Notes:


Dr. Chan verbally involved in patient care.


Dr. Grigsby was consulted on patient and suggests bridging Lovenox for 5 days 

with Eliquis.


Patient was placed on expedited follow-up with primary care. Discussed the 

importance for this follow-up.


Voices understanding and is agreeable to plan of care. Denies any further 

questions or concerns at this time.





Diagnostics:


Lower extremity doppler US, PT/INR, PTT, CBC





Therapeutics:


Lovenox





Prescription:


Eliquis





Impression: 


DVT of left lower extremity





Plan:


1. Take medication as prescribed. You can alternate ibuprofen and Tylenol as 

directed for pain and discomfort.


2. Follow-up with her primary care provider as discussed. You been placed on 

the expedited follow-up list. Call Monday morning to verify appointment time.


3. Return to the ED as needed and as discussed.





Definitive disposition and diagnosis as appropriate pending reevaluation and 

review of above.





  ** Left Lower Leg


Pain Score (Numeric/FACES): 6





- Related Data


 Allergies











Allergy/AdvReac Type Severity Reaction Status Date / Time


 


No Known Allergies Allergy   Verified 10/11/19 23:05











Home Meds: 


 Home Meds





Levothyroxine 150 mcg PO DAILY 06/24/15 [History]


atorvaSTATin [Lipitor] 40 mg PO DAILY 06/25/15 [History]


Losartan [Cozaar] 100 mg PO DAILY 04/01/18 [History]


Colchicine 0.3 mg PO DAILY 10/11/19 [History]











Past Medical History


HEENT History: Reports: Impaired Vision


Cardiovascular History: Reports: High Cholesterol, Hypertension


Respiratory History: Reports: None


Other Respiratory History: does not use CPAP


Gastrointestinal History: Reports: None


Genitourinary History: Reports: None


Musculoskeletal History: Reports: Gout


Psychiatric History: Reports: None


Endocrine/Metabolic History: Reports: Hypothyroidism


Hematologic History: Reports: Blood Transfusion(s), Other (See Below)


Other Hematologic History: had blood transfusion as an infant.  blood clot 2017





- Infectious Disease History


Infectious Disease History: Reports: Chicken Pox


Other Infectious Disease History: Pt does not know





- Past Surgical History


HEENT Surgical History: Reports: None


GI Surgical History: Reports: None


Endocrine Surgical History: Reports: Other (See Below)


Other Endocrine Surgeries/Procedures: radiation to thyroid


Musculoskeletal Surgical History: Reports: Other (See Below)


Other Musculoskeletal Surgeries/Procedures:: 2 left knee surgeries; right torn 

meniscus 2015





Social & Family History





- Family History


Family Medical History: Noncontributory





- Tobacco Use


Smoking Status *Q: Never Smoker





- Caffeine Use


Caffeine Use: Reports: Soda





- Recreational Drug Use


Recreational Drug Use: No





Review of Systems





- Review of Systems


Review Of Systems: ROS reveals no pertinent complaints other than HPI.





ED EXAM, GENERAL





- Physical Exam


Exam: See Below (See dictation)





Course





- Vital Signs


Last Recorded V/S: 


 Last Vital Signs











Temp  97.3 F   10/11/19 23:09


 


Pulse  77   10/11/19 23:09


 


Resp  17   10/11/19 23:09


 


BP  130/83   10/11/19 23:09


 


Pulse Ox  95   10/11/19 23:09














- Orders/Labs/Meds


Orders: 


 Active Orders 24 hr











 Category Date Time Status


 


 Venous Doppler Lwr Ext Lt [US] Stat Exams  10/11/19 23:20 Taken


 


 CBC WITH AUTO DIFF [HEME] Stat Lab  10/11/19 23:52 Ordered


 


 INR,PT,PROTHROMBIN TIME [COAG] Stat Lab  10/11/19 23:50 Ordered


 


 PTT,PARTIAL THROMBOPLSTIN TIME [COAG] Stat Lab  10/11/19 23:50 Ordered











Meds: 


Medications














Discontinued Medications














Generic Name Dose Route Start Last Admin





  Trade Name Freq  PRN Reason Stop Dose Admin


 


Enoxaparin Sodium  102 mg  10/12/19 00:01  





  Lovenox  SUBCUT  10/12/19 00:02  





  ONETIME ONE   





     





     





     





     














Departure





- Departure


Time of Disposition: 00:14


Disposition: Home, Self-Care 01


Clinical Impression: 


Deep vein thrombosis (DVT) of left lower extremity


Qualifiers:


 Affected thrombotic vein of extremity: femoral Chronicity: acute Qualified Code

(s): I82.412 - Acute embolism and thrombosis of left femoral vein








- Discharge Information


Referrals: 


Matt Sheldon MD [Primary Care Provider] - 


Forms:  ED Department Discharge


Additional Instructions: 


The following information is given to patients seen in the emergency department 

who are being discharged to home. This information is to outline your options 

for follow-up care. We provide all patients seen in our emergency department 

with a follow-up referral.





The need for follow-up, as well as the timing and circumstances, are variable 

depending upon the specifics of your emergency department visit.





If you don't have a primary care physician on staff, we will provide you with a 

referral. We always advise you to contact your personal physician following an 

emergency department visit to inform them of the circumstance of the visit and 

for follow-up with them and/or the need for any referrals to a consulting 

specialist.





The emergency department will also refer you to a specialist when appropriate. 

This referral assures that you have the opportunity for follow-up care with a 

specialist. All of these measure are taken in an effort to provide you with 

optimal care, which includes your follow-up.





Under all circumstances we always encourage you to contact your private 

physician who remains a resource for coordinating your care. When calling for 

follow-up care, please make the office aware that this follow-up is from your 

recent emergency room visit. If for any reason you are refused follow-up, 

please contact the [facility] Emergency 

Department at [phone] and asked to speak to the emergency department 

charge nurse.





[facility]


Primary Care


[address] [place] [id number]


Phone: [phone]


Fax: [phone]





[address] [address]


Phone: [phone]


Fax: [phone]





1. Take medication as prescribed. You can alternate ibuprofen and Tylenol as 

directed for pain and discomfort.


2. Follow-up with your primary care provider as discussed. You been placed on 

the expedited follow-up list. Call Monday morning to verify appointment time.


3. Return to the ED as needed and as discussed.





 








- My Orders


Last 24 Hours: 


My Active Orders





10/11/19 23:20


Venous Doppler Lwr Ext Lt [US] Stat 





10/11/19 23:50


INR,PT,PROTHROMBIN TIME [COAG] Stat 


PTT,PARTIAL THROMBOPLSTIN TIME [COAG] Stat 





10/11/19 23:52


CBC WITH AUTO DIFF [HEME] Stat 














- Assessment/Plan


Last 24 Hours: 


My Active Orders





10/11/19 23:20


Venous Doppler Lwr Ext Lt [US] Stat 





10/11/19 23:50


INR,PT,PROTHROMBIN TIME [COAG] Stat 


PTT,PARTIAL THROMBOPLSTIN TIME [COAG] Stat 





10/11/19 23:52


CBC WITH AUTO DIFF [HEME] Stat

## 2019-10-12 NOTE — US
INDICATION:



Left calf pain 



TECHNIQUE:



Ultrasound venous duplex lower left extremity.  Compression venous exam was 

performed using gray-scale, color Doppler, and spectral Doppler analysis.



COMPARISON:



April 2, 2018 



FINDINGS:



Sonographic imaging demonstrates DVT within the left common femoral, 

superficial femoral, and popliteal veins. The left saphenous femoral 

junction, deep femoral, and posterior tibial veins are fully compressible 

with normal color Doppler blood flow.     



IMPRESSION:



Study is positive for DVT within the left common femoral, superficial 

femoral, and popliteal veins. Findings discussed with Dr. Cadena at 12:12 

a.m.  on October 12, 2019.



Dictated by Philly Fernandez MD @ Oct 12 2019 12:12AM



Signed by Dr. Philly Fernandez @ Oct 12 2019 12:12AM

## 2023-02-03 ENCOUNTER — HOSPITAL ENCOUNTER (OUTPATIENT)
Dept: HOSPITAL 56 - MW.SDS | Age: 68
Discharge: HOME | End: 2023-02-03
Attending: SURGERY
Payer: COMMERCIAL

## 2023-02-03 VITALS — HEART RATE: 77 BPM | DIASTOLIC BLOOD PRESSURE: 61 MMHG | SYSTOLIC BLOOD PRESSURE: 107 MMHG

## 2023-02-03 DIAGNOSIS — Z79.899: ICD-10-CM

## 2023-02-03 DIAGNOSIS — D12.2: ICD-10-CM

## 2023-02-03 DIAGNOSIS — E03.9: ICD-10-CM

## 2023-02-03 DIAGNOSIS — I10: ICD-10-CM

## 2023-02-03 DIAGNOSIS — E11.9: ICD-10-CM

## 2023-02-03 DIAGNOSIS — Z79.890: ICD-10-CM

## 2023-02-03 DIAGNOSIS — Z12.11: Primary | ICD-10-CM

## 2023-02-03 DIAGNOSIS — Z98.890: ICD-10-CM

## 2023-02-03 DIAGNOSIS — J30.9: ICD-10-CM

## 2023-02-03 DIAGNOSIS — Z88.6: ICD-10-CM

## 2023-02-03 DIAGNOSIS — E78.00: ICD-10-CM

## 2023-02-03 PROCEDURE — 45380 COLONOSCOPY AND BIOPSY: CPT
